# Patient Record
Sex: MALE | Employment: OTHER | URBAN - METROPOLITAN AREA
[De-identification: names, ages, dates, MRNs, and addresses within clinical notes are randomized per-mention and may not be internally consistent; named-entity substitution may affect disease eponyms.]

---

## 2017-04-19 ENCOUNTER — HOSPITAL ENCOUNTER (OUTPATIENT)
Dept: CT IMAGING | Age: 67
Discharge: HOME | End: 2017-04-19
Attending: INTERNAL MEDICINE

## 2017-04-19 VITALS — DIASTOLIC BLOOD PRESSURE: 84 MMHG | SYSTOLIC BLOOD PRESSURE: 127 MMHG | HEART RATE: 61 BPM

## 2017-04-19 DIAGNOSIS — Z13.220 SCREENING CHOLESTEROL LEVEL: ICD-10-CM

## 2017-04-19 NOTE — IMAGING NOTE
Pt here for heart smart. Labs drawn total wwwhat=152 hdl= 40 ech=146 ldl=83  Glucose=113. DAUGHTER Temo Zamarripas here to assist with translation for patient. Daughter is a nurse.   Pt was informed of high calcium score 2927.03 Dr Itzel Farrell contacted on